# Patient Record
Sex: MALE | Race: WHITE | NOT HISPANIC OR LATINO
[De-identification: names, ages, dates, MRNs, and addresses within clinical notes are randomized per-mention and may not be internally consistent; named-entity substitution may affect disease eponyms.]

---

## 2019-05-16 PROBLEM — Z00.00 ENCOUNTER FOR PREVENTIVE HEALTH EXAMINATION: Status: ACTIVE | Noted: 2019-05-16

## 2019-05-22 ENCOUNTER — APPOINTMENT (OUTPATIENT)
Dept: NEUROSURGERY | Facility: CLINIC | Age: 44
End: 2019-05-22
Payer: COMMERCIAL

## 2019-05-22 VITALS
DIASTOLIC BLOOD PRESSURE: 80 MMHG | WEIGHT: 210 LBS | SYSTOLIC BLOOD PRESSURE: 115 MMHG | BODY MASS INDEX: 27.83 KG/M2 | HEART RATE: 80 BPM | HEIGHT: 73 IN

## 2019-05-22 PROCEDURE — 99204 OFFICE O/P NEW MOD 45 MIN: CPT

## 2019-05-22 RX ORDER — OXYCODONE AND ACETAMINOPHEN 5; 325 MG/1; MG/1
5-325 TABLET ORAL
Qty: 60 | Refills: 0 | Status: ACTIVE | COMMUNITY
Start: 2019-05-22 | End: 1900-01-01

## 2019-05-22 RX ORDER — GABAPENTIN 100 MG/1
100 CAPSULE ORAL
Refills: 0 | Status: ACTIVE | COMMUNITY

## 2019-05-22 RX ORDER — OXYCODONE AND ACETAMINOPHEN 5; 325 MG/1; MG/1
5-325 TABLET ORAL
Refills: 0 | Status: ACTIVE | COMMUNITY

## 2019-05-22 RX ORDER — METHYLPREDNISOLONE 4 MG/1
4 TABLET ORAL
Refills: 0 | Status: ACTIVE | COMMUNITY

## 2019-05-22 RX ORDER — OXYCODONE AND ACETAMINOPHEN 5; 325 MG/1; MG/1
5-325 TABLET ORAL
Qty: 28 | Refills: 0 | Status: ACTIVE | COMMUNITY
Start: 2019-05-22 | End: 1900-01-01

## 2019-05-22 NOTE — PHYSICAL EXAM
[4] : S1 flexor hallucis longus 4/5 [0] : S1 toe walking 0/5 [5] : S1 flexor hallucis longus 5/5 [Straight-Leg Raise Test - Right] : straight leg raise of the right leg was positive [Antalgic] : antalgic [FreeTextEntry7] : He has numbness and tingling in the S1 distribution on the right. [Able to toe walk] : the patient was not able to toe walk [Able to heel walk] : the patient was not able to heel walk

## 2019-05-22 NOTE — RESULTS/DATA
[FreeTextEntry1] : Previous MRI done in 2017 was briefly reviewed from [facility]. It showed a degenerated disc at L4-5 and a herniated disc at L5-S1 actually more paracentral to the left. The new MRI was reviewed in detail. This is a study done at [facility]. This shows an extremely large disc herniation L5-S1 on the right causing significant neural compression.

## 2019-05-22 NOTE — HISTORY OF PRESENT ILLNESS
[de-identified] : \par Lakhwinder is a pleasant 43-year-old here accompanied by his wife for evaluation of his lumbar spine. He has a history of back or disc surgery done by Dr. Nelson Boyce in 2017. He seems to have satisfactory result initially and then over the recent past has developed recurrent symptoms specifically excruciating right leg pain for about 3 weeks duration now. He states that he been having some back problems over the past year or so however this intensity is fairly short-lived to the point where he is basically unable to walk straight. His additional orthopedic history is that of a meniscus procedure done in 2018. His symptoms were right-sided radiculopathy. Has no symptoms of cauda equina compression. Has not undergone any pain management techniques either now or in the past. He responded slightly to steroid administration from his primary doctor.\par \par \par 2017  Barbie Briceño MD surgery\par 2018  Yasmany Meniscus surgery\par \par 3 weeks of severe pain\par \par

## 2019-05-22 NOTE — REASON FOR VISIT
[New Patient Visit] : a new patient visit [FreeTextEntry1] : Lower back pain- Right leg pain with tingling numbness and weakness- [facility] imaging

## 2019-05-22 NOTE — PLAN
[FreeTextEntry1] : DIAGNOSIS:  LUMBAR HERNIATED DISK  L45 S1    RIGHT     RECURRENT   \par TREATMENT PLAN:  REVISION LUMBAR MICRODISKECTOMY  L5 S1 RIGHT  \par \par This is a patient with lumbar herniated disk and radiculopathy.  I have recommended lumbar microdiskectomy  as a treatment option.  This entails removing the lamina and the medial facet joint along with the underlying hypertrophied ligamentum flavum and retrieving the herniated disk fragment as well as removing any weakened or damaged disk material at this level.  This will allow for a widening of the spinal canal and the neuroforamen at the effected level thus decompressing the nerve root. This should not result in added instability to the spine at this level.  This will not require the need for instrumented stabilization and fusion. \par \par Risks and benefits of surgery were described to the patient in detail which include but not excluding those otherwise not mentioned:  coma paralysis, death, stroke, spinal fluid leak, nerve injury, weakness, infection, spinal instability, vascular injury, re-herniation, adjacent segment degeneration and persistent pain.   That this is a recurrent disc herniation risk of spinal fluid leak and neural injury is increased however I discussed this with the patient is wife in detail and elects to proceed.\par \par

## 2019-05-28 ENCOUNTER — OUTPATIENT (OUTPATIENT)
Dept: OUTPATIENT SERVICES | Facility: HOSPITAL | Age: 44
LOS: 1 days | End: 2019-05-28
Payer: COMMERCIAL

## 2019-05-29 ENCOUNTER — OUTPATIENT (OUTPATIENT)
Dept: OUTPATIENT SERVICES | Facility: HOSPITAL | Age: 44
LOS: 1 days | End: 2019-05-29

## 2019-05-29 ENCOUNTER — APPOINTMENT (OUTPATIENT)
Dept: NEUROSURGERY | Facility: HOSPITAL | Age: 44
End: 2019-05-29
Payer: COMMERCIAL

## 2019-05-29 PROCEDURE — 63042 LAMINOTOMY SINGLE LUMBAR: CPT | Mod: RT

## 2019-05-29 PROCEDURE — 72100 X-RAY EXAM L-S SPINE 2/3 VWS: CPT | Mod: 26

## 2019-06-05 ENCOUNTER — APPOINTMENT (OUTPATIENT)
Dept: NEUROSURGERY | Facility: CLINIC | Age: 44
End: 2019-06-05
Payer: COMMERCIAL

## 2019-06-05 VITALS
SYSTOLIC BLOOD PRESSURE: 122 MMHG | HEIGHT: 74 IN | WEIGHT: 205 LBS | BODY MASS INDEX: 26.31 KG/M2 | DIASTOLIC BLOOD PRESSURE: 74 MMHG

## 2019-06-05 DIAGNOSIS — Z78.9 OTHER SPECIFIED HEALTH STATUS: ICD-10-CM

## 2019-06-05 PROCEDURE — 99024 POSTOP FOLLOW-UP VISIT: CPT

## 2019-06-05 NOTE — ASSESSMENT
[FreeTextEntry1] : Mr. Cevallos is one week status post L5-S1 right microdiscectomy and doing extremely well. He is to start increasing his activity as he feels comfortable, but I did advise him to be careful with bending/lifting/twisting for the next 6- 8 weeks. He is not to submerge the wound in water until it has completely healed. He is not to apply any ointment to the wound until it has completely healed. He is to continue with the Naproxen or Tylenol as needed for pain. He is to follow-up in the office in about a month to reevaluate his progress.

## 2019-06-05 NOTE — HISTORY OF PRESENT ILLNESS
[FreeTextEntry1] : Mr. Cevallos presents to the office today one week status post right L5-S1 microdiscectomy. He is doing extremely well and reports significant improvement in his leg pain. He has no paresthesias in his leg. He has some mild pressure at the surgical site, but he has only been taking Tylenol for pain. He otherwise offers no complaints.

## 2019-06-05 NOTE — REASON FOR VISIT
[de-identified] : revision microdiscectomy l5-s1 [de-identified] : 05/29/2019 [de-identified] : PT STATES HE IS DOING VERY WELL

## 2019-06-05 NOTE — PHYSICAL EXAM
[General Appearance - Alert] : alert [General Appearance - In No Acute Distress] : in no acute distress [General Appearance - Well Nourished] : well nourished [General Appearance - Well Developed] : well developed [] : normal voice and communication [General Appearance - Well-Appearing] : healthy appearing [Longitudinal] : longitudinal [Dry] : dry [Clean] : clean [Intact] : intact [Person] : oriented to person [Place] : oriented to place [Time] : oriented to time [Involuntary Movements] : no involuntary movements were seen [Motor Tone] : muscle tone was normal in all four extremities [No Muscle Atrophy] : normal bulk in all four extremities [Motor Handedness Right-Handed] : the patient is right hand dominant [Balance] : balance was intact [3] : 3/5 Great Toe Extension (L5) [4] : 4/5 Ankle Plantar Flexion (S1) [5] : 5/5 Ankle Plantar Flexion (S1) [FreeTextEntry1] : well appearing

## 2019-07-08 ENCOUNTER — APPOINTMENT (OUTPATIENT)
Dept: NEUROSURGERY | Facility: CLINIC | Age: 44
End: 2019-07-08

## 2019-07-08 ENCOUNTER — APPOINTMENT (OUTPATIENT)
Dept: NEUROSURGERY | Facility: CLINIC | Age: 44
End: 2019-07-08
Payer: COMMERCIAL

## 2019-07-08 VITALS
WEIGHT: 205 LBS | HEIGHT: 74 IN | DIASTOLIC BLOOD PRESSURE: 81 MMHG | BODY MASS INDEX: 26.31 KG/M2 | SYSTOLIC BLOOD PRESSURE: 131 MMHG | HEART RATE: 97 BPM

## 2019-07-08 PROCEDURE — 99024 POSTOP FOLLOW-UP VISIT: CPT

## 2019-07-12 NOTE — PHYSICAL EXAM
[General Appearance - In No Acute Distress] : in no acute distress [General Appearance - Alert] : alert [Longitudinal] : longitudinal [Clean] : clean [Dry] : dry [Well-Healed] : well-healed [No Drainage] : without drainage [Normal Skin] : normal [Normal Skin Turgor] : skin turgor was normal [Oriented To Time, Place, And Person] : oriented to person, place, and time [Impaired Insight] : insight and judgment were intact [Mood] : the mood was normal [Affect] : the affect was normal [Place] : oriented to place [Person] : oriented to person [Motor Tone] : muscle tone was normal in all four extremities [Time] : oriented to time [Motor Strength] : muscle strength was normal in all four extremities [Limited Balance] : the patient's balance was impaired [Erythema] : not erythematous [Tender] : not tender [Warm] : not warm [Indurated] : not indurated [Fluctuant] : not fluctuant

## 2019-07-12 NOTE — REASON FOR VISIT
[de-identified] : post revision microdiscectomy L5S1.  [de-identified] : 5/29/2019 [de-identified] : He states his back is doing well- no issues as far as the surgery goes. He states he has other neurological issues going on.

## 2019-07-12 NOTE — HISTORY OF PRESENT ILLNESS
[FreeTextEntry1] : 44 y/o male presents to the office for follow-up evaluation s/p elective L5/S1 right microdiscectomy. The patient states that his pre-op pain in the right LE has resolved. He has returned to work as a , but has not been performing the physical component of the job. His pain is controlled with Tylenol only & has not been taking the narcotic medication. Currently wearing an LSO for additional support. \par The patient reports upper extremity clumsiness & issues with balance. The patient states that he was recently diagnosed with MS & is being treated by Dr. Smith at [facility].

## 2019-07-12 NOTE — ASSESSMENT
[FreeTextEntry1] : The patient reports that he is doing well after his elective procedure. Discussed back precautions when doing activities. LSO prn. Wound care discussed with the patient. NSAIDS prn.  The patient will continue to follow-up with his neurologist for MS management. He will follow-up with our office on an as-needed basis.

## 2019-07-12 NOTE — PHYSICAL EXAM
[General Appearance - Alert] : alert [General Appearance - In No Acute Distress] : in no acute distress [Longitudinal] : longitudinal [Clean] : clean [Dry] : dry [Well-Healed] : well-healed [Normal Skin] : normal [No Drainage] : without drainage [Normal Skin Turgor] : skin turgor was normal [Oriented To Time, Place, And Person] : oriented to person, place, and time [Impaired Insight] : insight and judgment were intact [Affect] : the affect was normal [Mood] : the mood was normal [Person] : oriented to person [Place] : oriented to place [Time] : oriented to time [Motor Tone] : muscle tone was normal in all four extremities [Motor Strength] : muscle strength was normal in all four extremities [Limited Balance] : the patient's balance was impaired [Erythema] : not erythematous [Tender] : not tender [Warm] : not warm [Indurated] : not indurated [Fluctuant] : not fluctuant

## 2019-07-12 NOTE — REASON FOR VISIT
[de-identified] : post revision microdiscectomy L5S1.  [de-identified] : 5/29/2019 [de-identified] : He states his back is doing well- no issues as far as the surgery goes. He states he has other neurological issues going on.

## 2019-07-12 NOTE — HISTORY OF PRESENT ILLNESS
[FreeTextEntry1] : 42 y/o male presents to the office for follow-up evaluation s/p elective L5/S1 right microdiscectomy. The patient states that his pre-op pain in the right LE has resolved. He has returned to work as a , but has not been performing the physical component of the job. His pain is controlled with Tylenol only & has not been taking the narcotic medication. Currently wearing an LSO for additional support. \par The patient reports upper extremity clumsiness & issues with balance. The patient states that he was recently diagnosed with MS & is being treated by Dr. Smith at [facility].

## 2020-01-24 ENCOUNTER — APPOINTMENT (OUTPATIENT)
Dept: NEUROSURGERY | Facility: CLINIC | Age: 45
End: 2020-01-24
Payer: COMMERCIAL

## 2020-01-24 VITALS
WEIGHT: 205 LBS | HEIGHT: 74 IN | DIASTOLIC BLOOD PRESSURE: 76 MMHG | SYSTOLIC BLOOD PRESSURE: 120 MMHG | BODY MASS INDEX: 26.31 KG/M2 | HEART RATE: 73 BPM

## 2020-01-24 VITALS
HEART RATE: 73 BPM | BODY MASS INDEX: 26.31 KG/M2 | SYSTOLIC BLOOD PRESSURE: 120 MMHG | HEIGHT: 74 IN | DIASTOLIC BLOOD PRESSURE: 76 MMHG | WEIGHT: 205 LBS

## 2020-01-24 DIAGNOSIS — M51.26 OTHER INTERVERTEBRAL DISC DISPLACEMENT, LUMBAR REGION: ICD-10-CM

## 2020-01-24 PROCEDURE — 99214 OFFICE O/P EST MOD 30 MIN: CPT

## 2020-01-24 RX ORDER — CELECOXIB 200 MG/1
200 CAPSULE ORAL TWICE DAILY
Qty: 30 | Refills: 1 | Status: ACTIVE | COMMUNITY
Start: 2020-01-24 | End: 1900-01-01

## 2020-01-24 RX ORDER — TRAMADOL HYDROCHLORIDE 50 MG/1
50 TABLET, COATED ORAL
Qty: 30 | Refills: 0 | Status: ACTIVE | COMMUNITY
Start: 2020-01-24 | End: 1900-01-01

## 2020-01-24 RX ORDER — METHYLPREDNISOLONE 4 MG/1
4 TABLET ORAL
Qty: 1 | Refills: 0 | Status: ACTIVE | COMMUNITY
Start: 2020-01-24 | End: 1900-01-01

## 2020-01-24 NOTE — ASSESSMENT
[FreeTextEntry1] : DIAGNOSIS:  DISK DEGENERATION  previous RIGHT L 5S1 HNP/MICRODISCECTOMY\par TREATMENT PLAN:  NON-SURGICAL\par \par \par This is a patient with recurrent radiculopathy without overt disc herniation suggesting the need for additional surgery. I have recommended nonsurgical management at this time.  This consists of physical therapy and/or manual medicine in conjunction to medical therapy and other conservative methods.  These include the consideration of trigger point injections and or the utilization of modalities such as TENS where applicable.  The next tier would be the referral to a pain management specialist (anesthesia or physiatry) for the consideration of spinal injections.  This includes the options of epidural steroids, facet injections as well as other novel techniques that may provide pain relief.  Although there is indeed evidence of disk degeneration on imaging, discectomy or spinal fusion for the sole purposes of treating back/leg pain in the absence of a compressive lesion or neurological issue is associated with poor outcomes.   \par \par I have reviewed the images in detail with the patient today in my office and have answered all questions regarding this condition to the best of my ability to the patient’s satisfaction.\par

## 2020-01-24 NOTE — PHYSICAL EXAM
[Straight-Leg Raise Test - Right] : straight leg raise of the right leg was positive [Antalgic] : antalgic [Intact] : all motor groups within normal limits of strength and tone bilaterally

## 2020-01-24 NOTE — RESULTS/DATA
[FreeTextEntry1] : \par [facility] MRI shows bulging discs at L4-5 and 51 with evidence of the previous laminotomy discectomy at L5-S1. Compared this to previous MRIs done especially the one done at [facility] which showed an extraordinarily large disc herniation recurrence. The current imaging really only shows scarring in that area without evidence of disc herniation of surgical significance.

## 2020-01-24 NOTE — REASON FOR VISIT
[Follow-Up: _____] : a [unfilled] follow-up visit [FreeTextEntry1] : \millicent Keane is here for reevaluation of his lumbar spine. He had a microdiscectomy 51 on 2 occasions I did the second one. He has some recurrent radicular complaints and an MRI for my review done to [facility].  He has right-sided radicular complaints as previous but not its severity in the past. He's really not undergone any new treatments other than getting imaging.  He also has a history of multiple sclerosis and is seen his neurologist.

## 2020-02-28 RX ORDER — CELECOXIB 200 MG/1
200 CAPSULE ORAL TWICE DAILY
Qty: 60 | Refills: 0 | Status: ACTIVE | COMMUNITY
Start: 2020-02-28 | End: 1900-01-01

## 2020-06-08 ENCOUNTER — RX RENEWAL (OUTPATIENT)
Age: 45
End: 2020-06-08

## 2020-07-07 ENCOUNTER — RX RENEWAL (OUTPATIENT)
Age: 45
End: 2020-07-07

## 2020-09-08 ENCOUNTER — RX RENEWAL (OUTPATIENT)
Age: 45
End: 2020-09-08

## 2020-11-04 ENCOUNTER — RX RENEWAL (OUTPATIENT)
Age: 45
End: 2020-11-04

## 2021-02-08 ENCOUNTER — RX RENEWAL (OUTPATIENT)
Age: 46
End: 2021-02-08

## 2021-02-08 RX ORDER — CELECOXIB 200 MG/1
200 CAPSULE ORAL
Qty: 60 | Refills: 1 | Status: ACTIVE | COMMUNITY
Start: 2020-04-08 | End: 1900-01-01

## 2021-11-05 ENCOUNTER — APPOINTMENT (OUTPATIENT)
Dept: NEUROSURGERY | Facility: CLINIC | Age: 46
End: 2021-11-05
Payer: COMMERCIAL

## 2021-11-05 VITALS — BODY MASS INDEX: 26.31 KG/M2 | HEIGHT: 74 IN | WEIGHT: 205 LBS

## 2021-11-05 DIAGNOSIS — M47.24 OTHER SPONDYLOSIS WITH RADICULOPATHY, THORACIC REGION: ICD-10-CM

## 2021-11-05 PROCEDURE — 99214 OFFICE O/P EST MOD 30 MIN: CPT

## 2021-11-05 NOTE — REASON FOR VISIT
[Follow-Up: _____] : a [unfilled] follow-up visit [Spouse] : spouse [FreeTextEntry1] : sever lower back pain

## 2021-11-05 NOTE — ASSESSMENT
[FreeTextEntry1] : In summary this 45-year-old male with history of MS and 2 lumbar discectomies last in 2020 by Dr. Salazar has a new low back pain with right lumbar radiculopathy and proximal leg weakness which is new.  At this time MRI of thoracic and lumbar spine without contrast is indicated to further investigate his neurological status symptoms and neurological deficit.  The meantime will start a Medrol Dosepak to help with the severity of the symptoms.  I discussed this with him and his wife who was present for the entire visit and I will follow up when work-up is completed

## 2021-11-05 NOTE — RESULTS/DATA
[FreeTextEntry1] : CT scan from [facility] shows a large osteophytic complex causing severe right greater than left bilateral foraminal stenosis at T11-12
Neuro

## 2021-11-05 NOTE — HISTORY OF PRESENT ILLNESS
[FreeTextEntry1] : 45-year-old male known to us for L5-S1 revision of right-sided discectomy in 2020.  He now presents with 5 days of new low back pain that radiates into his right belly area.  He does report new proximal right leg weakness he states that he has a chronic degree of right proximal leg weakness from his multiple sclerosis\par No new numbness bladder dysfunction balance issues denies any leg pain has not had any recent treatment or therapy.  Had to go to the emergency room yesterday due to severe symptoms.  He is taking Percocet with only mild relief in symptoms \par he chronically takes meloxicam

## 2021-11-08 RX ORDER — METHYLPREDNISOLONE 4 MG/1
4 TABLET ORAL
Qty: 1 | Refills: 0 | Status: ACTIVE | COMMUNITY
Start: 2021-11-08 | End: 1900-01-01

## 2021-11-16 ENCOUNTER — APPOINTMENT (OUTPATIENT)
Dept: NEUROSURGERY | Facility: CLINIC | Age: 46
End: 2021-11-16
Payer: COMMERCIAL

## 2021-11-16 VITALS
TEMPERATURE: 97.2 F | DIASTOLIC BLOOD PRESSURE: 80 MMHG | BODY MASS INDEX: 26.31 KG/M2 | HEART RATE: 103 BPM | SYSTOLIC BLOOD PRESSURE: 130 MMHG | HEIGHT: 74 IN | WEIGHT: 205 LBS

## 2021-11-16 DIAGNOSIS — M51.24 OTHER INTERVERTEBRAL DISC DISPLACEMENT, THORACIC REGION: ICD-10-CM

## 2021-11-16 DIAGNOSIS — G35 MULTIPLE SCLEROSIS: ICD-10-CM

## 2021-11-16 DIAGNOSIS — M54.14 RADICULOPATHY, THORACIC REGION: ICD-10-CM

## 2021-11-16 DIAGNOSIS — Z98.890 OTHER SPECIFIED POSTPROCEDURAL STATES: ICD-10-CM

## 2021-11-16 PROCEDURE — 99213 OFFICE O/P EST LOW 20 MIN: CPT

## 2021-11-16 RX ORDER — METHYLPREDNISOLONE 4 MG/1
4 TABLET ORAL
Qty: 1 | Refills: 0 | Status: ACTIVE | COMMUNITY
Start: 2021-11-16 | End: 1900-01-01

## 2021-11-16 RX ORDER — METHOCARBAMOL 750 MG/1
750 TABLET, FILM COATED ORAL
Qty: 30 | Refills: 1 | Status: ACTIVE | COMMUNITY
Start: 2021-11-16 | End: 1900-01-01

## 2021-11-16 NOTE — ASSESSMENT
[FreeTextEntry1] : \par Diagnosis: T10/11 disc herniation right history of microdisc surgery, history of multiple sclerosis\par Treatment: Conservative management including injections versus surgical intervention\par \par \par This is a patient with multiple sclerosis who has had lumbar microdisc surgery in the past.  The condition currently is this T10-11 disc herniation on the right with radiculopathy.  I recommended conservative management including steroids.  I recommended a pain management evaluation for a transforaminal injection at this level to help him with that thoracic radiculopathy.  He states neurologically he seems stable and EMS is at bay.  There is no overt spinal cord compression as a result of this disc therefore I do not think is causing myelopathy per se.  If his symptoms worsen or the disc herniation does start causing these types of symptomatology I told him that the surgery may be needed but this point we have all agreed to try to be as conservative as possible.

## 2021-11-16 NOTE — REASON FOR VISIT
[Follow-Up: _____] : a [unfilled] follow-up visit [FreeTextEntry1] : \par Lakhwinder is here for a imaging follow-up.  He is having some improvement in his pain and some improvement in his walking however this is definitely a setback for him having have a baseline of MS.  As noted in his past he had lumbar microdisc surgery with a recurrence that I treated.  He had an MRI of the lumbar and thoracic spine done at [facility].  He responded pretty well to the Medrol Dosepak given to him as an outpatient.\par \par \par neurology   Luis\par PCP  [place]

## 2021-11-16 NOTE — RESULTS/DATA
[FreeTextEntry1] : \par [facility] MRI was reviewed in detail.  The lumbar spine study shows some postoperative changes without any evidence of recurrent disc herniation.  What is notable on the thoracic spine however he has a fairly large disc herniation at T10-11 on the right.  It barely displaces the lateral cord and is right in the foramen on the side which seems to be concordant with his thoracic radiculopathy that he is complaining of.\par \par T10-11  RIGHT HNP